# Patient Record
(demographics unavailable — no encounter records)

---

## 2020-03-17 NOTE — RADIOLOGY REPORT (SQ)
EXAM DESCRIPTION:  CHEST PA/LATERAL



COMPLETED DATE/TIME:  3/17/2020 9:13 am



REASON FOR STUDY:  PRE-OP



COMPARISON:  None.



EXAM PARAMETERS:  NUMBER OF VIEWS: two views

TECHNIQUE: Digital Frontal and Lateral radiographic views of the chest acquired.

RADIATION DOSE: NA

LIMITATIONS: none



FINDINGS:  LUNGS AND PLEURA: No opacities, masses or pneumothorax. No pleural effusion.

MEDIASTINUM AND HILAR STRUCTURES: No masses or contour abnormalities.

HEART AND VASCULAR STRUCTURES: Heart normal size.  No evidence for failure.

BONES: No acute findings.

HARDWARE: None in the chest.

OTHER: No other significant finding.



IMPRESSION:  NO SIGNIFICANT RADIOGRAPHIC FINDING IN THE CHEST.



TECHNICAL DOCUMENTATION:  JOB ID:  [id number]

 2011 [employer]- All Rights Reserved



Reading location - IP/workstation name: [phone]

## 2020-03-17 NOTE — EKG REPORT
SEVERITY:- ABNORMAL ECG -

LEFT ANTERIOR FASCICULAR BLOCK

LVH BY VOLTAGE

ABNORMAL T, CONSIDER ISCHEMIA, INFERIOR LEADS

SINUS RHYTHM

BASELINE ARTIFACT

:

Confirmed by: Sera Cole MD 17-Mar-2020 14:35:42